# Patient Record
Sex: FEMALE | Race: WHITE | NOT HISPANIC OR LATINO | Employment: PART TIME | URBAN - METROPOLITAN AREA
[De-identification: names, ages, dates, MRNs, and addresses within clinical notes are randomized per-mention and may not be internally consistent; named-entity substitution may affect disease eponyms.]

---

## 2020-06-01 ENCOUNTER — COMMUNICATION - HEALTHEAST (OUTPATIENT)
Dept: UROLOGY | Facility: CLINIC | Age: 20
End: 2020-06-01

## 2020-06-02 ENCOUNTER — HOSPITAL ENCOUNTER (OUTPATIENT)
Dept: ADMINISTRATIVE | Facility: OTHER | Age: 20
Discharge: HOME | End: 2020-06-02

## 2020-06-05 ENCOUNTER — OFFICE VISIT - HEALTHEAST (OUTPATIENT)
Dept: UROLOGY | Facility: CLINIC | Age: 20
End: 2020-06-05

## 2020-06-05 DIAGNOSIS — N20.1 CALCULUS OF URETER: ICD-10-CM

## 2020-06-05 DIAGNOSIS — N20.0 CALCULUS OF KIDNEY: ICD-10-CM

## 2020-06-05 RX ORDER — NORETHINDRONE AND ETHINYL ESTRADIOL 1 MG-35MCG
KIT ORAL
Status: SHIPPED | COMMUNITY
Start: 2020-03-15 | End: 2022-08-04

## 2020-06-22 ENCOUNTER — AMBULATORY - HEALTHEAST (OUTPATIENT)
Dept: UROLOGY | Facility: CLINIC | Age: 20
End: 2020-06-22

## 2020-06-22 ENCOUNTER — AMBULATORY - HEALTHEAST (OUTPATIENT)
Dept: LAB | Facility: HOSPITAL | Age: 20
End: 2020-06-22

## 2020-06-22 DIAGNOSIS — N20.0 CALCULUS OF KIDNEY: ICD-10-CM

## 2020-06-25 ENCOUNTER — RECORDS - HEALTHEAST (OUTPATIENT)
Dept: ADMINISTRATIVE | Facility: OTHER | Age: 20
End: 2020-06-25

## 2020-06-25 LAB
APPEARANCE STONE: NORMAL
COMPN STONE: NORMAL
NUMBER STONE: 4
SIZE STONE: NORMAL MM
SPECIMEN WT: 11 MG

## 2020-07-01 ENCOUNTER — COMMUNICATION - HEALTHEAST (OUTPATIENT)
Dept: UROLOGY | Facility: CLINIC | Age: 20
End: 2020-07-01

## 2020-07-29 ENCOUNTER — AMBULATORY - HEALTHEAST (OUTPATIENT)
Dept: LAB | Facility: CLINIC | Age: 20
End: 2020-07-29

## 2020-07-29 DIAGNOSIS — N20.0 CALCULUS OF KIDNEY: ICD-10-CM

## 2020-07-29 LAB
ALBUMIN SERPL-MCNC: 4 G/DL (ref 3.5–5)
ANION GAP SERPL CALCULATED.3IONS-SCNC: 8 MMOL/L (ref 5–18)
BUN SERPL-MCNC: 12 MG/DL (ref 8–22)
CALCIUM SERPL-MCNC: 9.5 MG/DL (ref 8.5–10.5)
CALCIUM SERPL-MCNC: 9.7 MG/DL (ref 8.5–10.5)
CALCIUM, IONIZED MEASURED: 1.19 MMOL/L (ref 1.11–1.3)
CHLORIDE BLD-SCNC: 104 MMOL/L (ref 98–107)
CO2 SERPL-SCNC: 29 MMOL/L (ref 22–31)
CREAT SERPL-MCNC: 0.79 MG/DL (ref 0.6–1.1)
CREAT SERPL-MCNC: 0.79 MG/DL (ref 0.6–1.1)
GFR SERPL CREATININE-BSD FRML MDRD: >60 ML/MIN/1.73M2
GFR SERPL CREATININE-BSD FRML MDRD: >60 ML/MIN/1.73M2
GLUCOSE BLD-MCNC: 80 MG/DL (ref 70–125)
ION CA PH 7.4: 1.18 MMOL/L (ref 1.11–1.3)
MAGNESIUM SERPL-MCNC: 2 MG/DL (ref 1.8–2.6)
PH: 7.38 (ref 7.35–7.45)
PHOSPHATE SERPL-MCNC: 4.2 MG/DL (ref 2.5–4.5)
PHOSPHATE SERPL-MCNC: 4.4 MG/DL (ref 2.5–4.5)
POTASSIUM BLD-SCNC: 4 MMOL/L (ref 3.5–5)
PTH-INTACT SERPL-MCNC: 46 PG/ML (ref 10–86)
SODIUM SERPL-SCNC: 141 MMOL/L (ref 136–145)
URATE SERPL-MCNC: 3.5 MG/DL (ref 2–7.5)

## 2020-07-31 ENCOUNTER — OFFICE VISIT - HEALTHEAST (OUTPATIENT)
Dept: UROLOGY | Facility: CLINIC | Age: 20
End: 2020-07-31

## 2020-07-31 DIAGNOSIS — N20.0 CALCULUS OF KIDNEY: ICD-10-CM

## 2020-07-31 DIAGNOSIS — R34 LOW URINE OUTPUT: ICD-10-CM

## 2020-07-31 DIAGNOSIS — R82.992 HYPEROXALURIA: ICD-10-CM

## 2020-11-20 ENCOUNTER — AMBULATORY - HEALTHEAST (OUTPATIENT)
Dept: UROLOGY | Facility: CLINIC | Age: 20
End: 2020-11-20

## 2020-11-20 DIAGNOSIS — N20.0 CALCULUS OF KIDNEY: ICD-10-CM

## 2020-11-24 ENCOUNTER — HOSPITAL ENCOUNTER (OUTPATIENT)
Dept: CT IMAGING | Facility: HOSPITAL | Age: 20
Discharge: HOME | End: 2020-11-24
Attending: PHYSICIAN ASSISTANT

## 2020-11-24 DIAGNOSIS — N20.0 CALCULUS OF KIDNEY: ICD-10-CM

## 2020-11-25 ENCOUNTER — OFFICE VISIT - HEALTHEAST (OUTPATIENT)
Dept: UROLOGY | Facility: CLINIC | Age: 20
End: 2020-11-25

## 2020-11-25 DIAGNOSIS — R82.992 HYPEROXALURIA: ICD-10-CM

## 2020-11-25 DIAGNOSIS — N20.0 CALCULUS OF KIDNEY: ICD-10-CM

## 2020-11-25 DIAGNOSIS — R34 LOW URINE OUTPUT: ICD-10-CM

## 2021-05-26 ENCOUNTER — RECORDS - HEALTHEAST (OUTPATIENT)
Dept: ADMINISTRATIVE | Facility: CLINIC | Age: 21
End: 2021-05-26

## 2021-05-30 ENCOUNTER — RECORDS - HEALTHEAST (OUTPATIENT)
Dept: ADMINISTRATIVE | Facility: CLINIC | Age: 21
End: 2021-05-30

## 2021-06-08 NOTE — PROGRESS NOTES
2........................................  Assessment/Plan:        Diagnoses and all orders for this visit:    Calculus of kidney  -     Magnesium, 24 Hour Urine; Future; Expected date: 07/05/2020  -     Stone Formation, 24 Hour Urine (does not include Magnesium); Standing  -     Renal Function Profile; Future; Expected date: 06/19/2020  -     Uric Acid; Future; Expected date: 06/19/2020  -     Magnesium; Future; Expected date: 06/19/2020  -     Calcium, Ionized, Measured; Future; Expected date: 06/19/2020  -     Parathyroid Hormone Intact with Minerals; Future; Expected date: 06/19/2020  -     Patient Stated Goal: Prevent further stones  -     24 Hour Urine Collection Steps Education  -     Stone Analysis- Standing; Standing    Calculus of ureter    Other orders  -     PIRMELLA 1-35 mg-mcg per tablet      Stone Management Plan  [facility] 6/5/2020   Urinary Tract Infection No suspicion of infection   Renal Colic Asymptomatic at this time   Renal Failure No suspicion of renal failure   Current CT date 5/31/2020   Right sided stones? Yes   R Number of ureteral stones 1   R GSD of ureteral stones 2   R Location of ureteral stone Distal   R Number of kidney stones  5   R GSD of kidney stones 2 - 4   R Hydronephrosis Mild   R Stone Event New event   Diagnosis date 5/31/2020   Initial location of primary symptomatic stone Distal   Initial GSD of primary symptomatic stone 2   R MET status Passed   R Current Plan Observe   Observe rationale Limited stone burden with good prognosis for spontaneous passage   Left sided stones? Yes   L Number of ureteral stones No ureteral stones   L Number of kidney stones  4   L GSD of kidney stones 2 - 4   L Hydronephrosis None   L Stone Event No current event   L Current Plan Observe   Observe rationale Limited stone burden with good prognosis for spontaneous passage             Phone call duration: 18 minutes    Malachi Flanagan MD    Subjective:        The patient has  "been notified of following:     \"This telephone visit will be conducted via a call between you and your physician/provider. We have found that certain health care needs can be provided without the need for a physical exam.  This service lets us provide the care you need with a short phone conversation. If a prescription is necessary, we can send it directly to your pharmacy.  If labs and/or imaging are needed, we can place orders so that you can have the test (s) done at a later time.    If during the course of the call the physician/provider feels a telephone visit is not appropriate, you will not be charged for this service.\"     HPI  Ms. Florencia Arteaga is a 19 y.o.  female who is being evaluated via a billable telephone visit by [facility] [place] with history of onset of abdominal and right back pain the last several days of May culminating in severe pain on May 31, 2020 associated with urinary frequency and some urgency.  She had no nausea or vomiting.  She presented to the emergency room where her pain was controlled.    Lab in the ER included UA specific gravity 1.025 pH 7.0 greater than 100 RBCs 25-50 WBCs no bacteria negative nitrate.  Sodium 138 potassium 3.8 calcium 9.4 creatinine 0.81.  White blood count 12,100 hemoglobin 12.5.  Subsequent urine culture showed no growth.      CT scan with IV contrast obtained 5/31/2020 is personally reviewed and demonstrates a 1 to 2 mm stone at the right UVJ with mild hydroureteronephrosis.  There are 5 stones noted on the right kidney one in the upper pole 4 in the lower pole measuring 1 to 3 mm.  On the left there for stones measuring 2 to 4 mm in the upper and lower pole.  There is no hydronephrosis on the right.    Since being in the ER patient had mild pain initially with frequency and urgency but over the last 2 days she has had no spinal pain and no voiding symptoms.  She is recovered 3 small objects she thinks her stones.    Patient " has had no previous stones but family history is positive in her father having stones.    Inpression right distal ureteral stone passed      Bilateral small renal stones multiple as noted .      PLAN   recommended stone risk studies in view of her youth and multiple stones to include 24-hour urines x2 PTH ionized calcium renal function profile uric acid with disposition to follow.  Patient was in agreement with plan.      Patient has available to her stone trial of passage protocol to include acetaminophen ibuprofen Dramamine along with oxycodone.  Patient also has a strainer.  Advised her to make a kit and take it with her when she travels in the event that she has typical renal colic and if she can control her symptoms she need not go into an ER.        ROS   A 12 point comprehensive review of systems is negative except for HPI    No past medical history on file.    No past surgical history on file.    Current Outpatient Medications   Medication Sig Dispense Refill     PIRMELLA 1-35 mg-mcg per tablet        acetaminophen (TYLENOL) 500 MG tablet Take 2 tablets (1,000 mg total) by mouth 4 (four) times a day for 7 days. 56 tablet 0     dimenhyDRINATE (DRAMAMINE) 50 MG tablet Take 1 tablet (50 mg total) by mouth at bedtime for 7 days. 7 tablet 0     dimenhyDRINATE (DRAMAMINE) 50 MG tablet Take 1 tablet (50 mg total) by mouth 4 (four) times a day as needed. 28 tablet 0     ibuprofen (ADVIL,MOTRIN) 200 MG tablet Take 2 tablets (400 mg total) by mouth 4 (four) times a day for 7 days. 56 tablet 0     No current facility-administered medications for this visit.        Allergies   Allergen Reactions     Sulfa (Sulfonamide Antibiotics) Rash       Social History     Socioeconomic History     Marital status: Single     Spouse name: Not on file     Number of children: Not on file     Years of education: Not on file     Highest education level: Not on file   Occupational History     Not on file   Social Needs     Financial  resource strain: Not on file     Food insecurity     Worry: Not on file     Inability: Not on file     Transportation needs     Medical: Not on file     Non-medical: Not on file   Tobacco Use     Smoking status: Not on file   Substance and Sexual Activity     Alcohol use: Not on file     Drug use: Not on file     Sexual activity: Not on file   Lifestyle     Physical activity     Days per week: Not on file     Minutes per session: Not on file     Stress: Not on file   Relationships     Social connections     Talks on phone: Not on file     Gets together: Not on file     Attends Anabaptism service: Not on file     Active member of club or organization: Not on file     Attends meetings of clubs or organizations: Not on file     Relationship status: Not on file     Intimate partner violence     Fear of current or ex partner: Not on file     Emotionally abused: Not on file     Physically abused: Not on file     Forced sexual activity: Not on file   Other Topics Concern     Not on file   Social History Narrative     Not on file       No family history on file.    Objective:      Physical Exam  There were no vitals filed for this visit.    Labs

## 2021-06-08 NOTE — TELEPHONE ENCOUNTER
Message left for patient to call clinic to set up an appointment for kidney stone follow-up with our PA.  Izabela Barber RN

## 2021-06-08 NOTE — PATIENT INSTRUCTIONS - HE
Patient Stated Goal: Prevent further stones  Steps for collecting a 24 hour urine specimen    Please follow the directions carefully. All urine voided for a 24-hour period needs to be collected into the jug.  DO NOT change any of your  normal  daily habits when doing this test. Continue to follow your regular diet, intake of fluids, and usual activity level. Pick the most convenient day with your schedule, perhaps on a weekend or a day off.    Start your Diet Log the day before collection and continue on the day of urine collection.  You MUST bring Diet Log with you on follow up visit to discuss results.    One 24hr Urine Collection     Two 24hr Urine Collections  (do not collect on consecutive days)    PLEASE COMPLETE THE 2nd JUG WITHIN 1-2 WEEKS FROM THE 1st JUG    STEP 1  Empty your bladder completely into the toilet. This will be your start time. Write your full legal name, start date and time on the jug label.  Collection start and stop times need to match exactly!  For example:  6 am to 6 am.    STEP 2  The next time you urinate, empty your bladder directly into the jug or collection hat and pour urine into the jug.  Screw the lid back onto the jug.  Do not spill!    STEP 3  Place the jug in the refrigerator or a cooler with ice during the collection period.  Failure to keep it cool could cause inaccurate test results. DO NOT Freeze.    STEP 4  Continue collecting all urine into the jug for the rest of the day, for the full 24 hours.  DO NOT stop early or go over 24 hours!    STEP 5  Exactly 24 hours from start of collection, write your full legal name, stop date and time on the jug label.   Collection start and stop times need to match exactly!  For example:  6 am to 6 am.  Failure to label correctly will result in recollection of urine specimen.    STEP 6  Return each jug within 24 hours after final urination.     STEP 7  Drop off jug locations:   [facility]: Mon-Fri 7am-7pm - Closed on  weekends  [facility]: Mon-Fri 7am-5pm - Closed on Sunday  [facility]: Mon-Fri 7am-6:30pm - Closed on weekends    STEP 8  Please call [facility] after return of your final jug to schedule your follow-up visit. [phone]

## 2021-06-10 NOTE — PATIENT INSTRUCTIONS - HE
Patient Stated Goal: Prevent further stones  INCREASING FLUIDS TO DECREASE RISK    Low Urinary Volume:     Kidney stones form because there is not enough water to dissolve the concentrated chemicals and minerals in urine.     Studies have found that people who make kidney stones should drink enough fluids so that they produce at least 2 liters (2 quarts) of urine per day.     Studies have shown that virtually every fluid you might drink decreases the risk of stone formation. Acceptable fluids include milk, coffee, diet and regular sodas, alcoholic beverages, fruit juices and even plain old water.    Increasing fluid intake will increase urine volume and decrease the chance of kidney stone formation.    Fluids:    Anything liquid that fits in a glass counts.     One way to gauge if your body has enough fluids is to check the color of your urine. If the urine is dark and yellow you do not have enough fluids. Urine will be pale yellow to clear if your body has enough fluids.    What we need to survive:    Most fluid we drink is lost through evaporation, more if active in hot humid environments    Body wastes can be cleared in a small amount of urine    All fluid that is consumed in excess of necessary losses  dilutes the urine    Ways to Increase Fluids Daily:    Drink more fluids throughout the day and into the evening. (You may need to awake from sleep to urinate which is a good indication of volume status)    Keep your refrigerator stocked with beverages you like    Drink a variety of fluids - mix it up    Add another beverage to your regular beverage at every meal    Keep a beverage at your desk (work area) and finish it before you leave for breaks, meetings, lunch and end of day    Use larger volume glasses    Whenever you pass a water fountain - stop and drink    Drink a beverage with snacks, if it is a salty snack, double the beverage    Take medication with a full glass of water/fluid    Keep a bottle of  water in your car and drink every 20 miles    Eat high water content fruits (melons, oranges, grapes, etc.)    Flavor water with a squeeze of lemon or lime or Crystal Light     If you do something repetitive throughout the day, link it with having something to drink    When you give your child/children something to drink, you have something to drink also    [facility] [place] can respond to your questions or concerns 24 hours a day at [phone].      Oxalate Food List    See Handout:  Do not eat foods containing more than 50 mg oxalate per 100 gm serving.  Foods containing between 5-50mg should be eaten in moderation (a single 4oz. serving per day).  Remember the purpose of the low oxalate diet is to avoid supersaturation (excess concentration) of the urine with oxalate; therefore small amounts periodically are less harmful than single large amounts.

## 2021-06-10 NOTE — PROGRESS NOTES
Assessment/Plan:        Diagnoses and all orders for this visit:    Calculus of kidney  -     Magnesium, 24 Hour Urine; Future; Expected date: 10/29/2020  -     Stone Formation, 24 Hour Urine (does not include Magnesium); Future; Expected date: 10/29/2020  -     Patient Stated Goal: Prevent further stones  -     Calcium Oxalate Stone Prevention Education  -     Patient Increasing Fluids Education    Hyperoxaluria  -     Magnesium, 24 Hour Urine; Future; Expected date: 10/29/2020  -     Stone Formation, 24 Hour Urine (does not include Magnesium); Future; Expected date: 10/29/2020  -     Patient Stated Goal: Prevent further stones  -     Calcium Oxalate Stone Prevention Education  -     Patient Increasing Fluids Education    Low urine output  -     Magnesium, 24 Hour Urine; Future; Expected date: 10/29/2020  -     Stone Formation, 24 Hour Urine (does not include Magnesium); Future; Expected date: 10/29/2020  -     Patient Stated Goal: Prevent further stones  -     Calcium Oxalate Stone Prevention Education  -     Patient Increasing Fluids Education      Stone Management Plan  [facility] 6/5/2020 7/31/2020   Urinary Tract Infection No suspicion of infection No suspicion of infection   Renal Colic Asymptomatic at this time Asymptomatic at this time   Renal Failure No suspicion of renal failure No suspicion of renal failure   Current CT date 5/31/2020 -   Right sided stones? Yes -   R Number of ureteral stones 1 -   R GSD of ureteral stones 2 -   R Location of ureteral stone Distal -   R Number of kidney stones  5 -   R GSD of kidney stones 2 - 4 -   R Hydronephrosis Mild -   R Stone Event New event No current event   Diagnosis date 5/31/2020 -   Initial location of primary symptomatic stone Distal -   Initial GSD of primary symptomatic stone 2 -   R MET status Passed -   R Current Plan Observe -   Observe rationale Limited stone burden with good prognosis for spontaneous passage -   Left sided stones? Yes -  "  L Number of ureteral stones No ureteral stones -   L Number of kidney stones  4 -   L GSD of kidney stones 2 - 4 -   L Hydronephrosis None -   L Stone Event No current event No current event   L Current Plan Observe -   Observe rationale Limited stone burden with good prognosis for spontaneous passage -             Phone call duration: 13 minutes    Malachi Flanagan MD     Subjective:      The patient has been notified of following:     \"This telephone visit will be conducted via a call between you and your physician/provider. We have found that certain health care needs can be provided without the need for a physical exam.  This service lets us provide the care you need with a short phone conversation.  If a prescription is necessary we can send it directly to your pharmacy.  If labs and/or imaging are needed, we can place orders so you can have the test (s) done at a later time.    If during the course of the call the physician/provider feels a telephone visit is not appropriate, you will not be charged for this service.\"     HPI  Ms. Florencia Arteaga is a 19 y.o.  female who is being evaluated via a billable telephone visit by [facility] [place] for follow-up stone risk studies with history of passing small distal right stone comprised of 30% calcium oxalate 70% calcium phosphate appetite.  Family history positive to relatives.  This was patient's first stone.    Serum studies included normal PTH of 46 normal ionized calcium normal uric acid.  24-hour urine studies demonstrated 980 cc and 1140 cc respectively for volume oxalate elevated at 49 and 54.  Citrate low normal 391 440.  Remainder of studies were all normal.    Impression bilateral renal stones 5 on right 1 to 3 mm 4 on left largest being 5.4 the rest 2 to 4 mm  Risk factors low volume hyperoxaluria    Plan increase urine output 80 to 100 ounces per 24 hours avoid excess high oxalate containing foods in large amounts  Use of " citrate rich fluids such as Crystal light lemonade or using real lemon juice or fresh arcadio and water to help with adding fluid intake    Follow-up 24-hour urine 3 months.    Would recommend CT in roughly 1 year.    explained the patient she might consider clearance of stones at some point if she were to decide to have a family and stones were growing and not easily passable.           ROS   Review of systems is negative except for HPI.    No past medical history on file.    No past surgical history on file.    Current Outpatient Medications   Medication Sig Dispense Refill     PIRMELLA 1-35 mg-mcg per tablet        No current facility-administered medications for this visit.        Allergies   Allergen Reactions     Sulfa (Sulfonamide Antibiotics) Rash       Social History     Socioeconomic History     Marital status: Single     Spouse name: Not on file     Number of children: Not on file     Years of education: Not on file     Highest education level: Not on file   Occupational History     Not on file   Social Needs     Financial resource strain: Not on file     Food insecurity     Worry: Not on file     Inability: Not on file     Transportation needs     Medical: Not on file     Non-medical: Not on file   Tobacco Use     Smoking status: Not on file   Substance and Sexual Activity     Alcohol use: Not on file     Drug use: Not on file     Sexual activity: Not on file   Lifestyle     Physical activity     Days per week: Not on file     Minutes per session: Not on file     Stress: Not on file   Relationships     Social connections     Talks on phone: Not on file     Gets together: Not on file     Attends [employer]ist service: Not on file     Active member of club or organization: Not on file     Attends meetings of clubs or organizations: Not on file     Relationship status: Not on file     Intimate partner violence     Fear of current or ex partner: Not on file     Emotionally abused: Not on file     Physically  abused: Not on file     Forced sexual activity: Not on file   Other Topics Concern     Not on file   Social History Narrative     Not on file       No family history on file.    Objective:      Labs   Stone prevention labs   Serum chemistries   Creatinine   Date Value Ref Range Status   07/29/2020 0.79 0.60 - 1.10 mg/dL Final   07/29/2020 0.79 0.60 - 1.10 mg/dL Final   05/31/2020 0.81 0.60 - 1.10 mg/dL Final     Potassium   Date Value Ref Range Status   07/29/2020 4.0 3.5 - 5.0 mmol/L Final   05/31/2020 3.8 3.5 - 5.0 mmol/L Final     Calcium   Date Value Ref Range Status   07/29/2020 9.7 8.5 - 10.5 mg/dL Final   07/29/2020 9.5 8.5 - 10.5 mg/dL Final   05/31/2020 9.4 8.5 - 10.5 mg/dL Final     Phosphorus   Date Value Ref Range Status   07/29/2020 4.4 2.5 - 4.5 mg/dL Final   07/29/2020 4.2 2.5 - 4.5 mg/dL Final     Uric Acid   Date Value Ref Range Status   07/29/2020 3.5 2.0 - 7.5 mg/dL Final   , Calcium metabolism   Calcium, Ionized Measured   Date Value Ref Range Status   07/29/2020 1.19 1.11 - 1.30 mmol/L Final      PTH   Date Value Ref Range Status   07/29/2020 46 10 - 86 pg/mL Final     No results found for: [id number]  and 24 hour urine No results found for: ECFXJ11TKCG, CALCIUMUR, SQ67IFM, [id number], [id number], LABPH, LABURIN

## 2021-06-13 NOTE — PROGRESS NOTES
.  Assessment/Plan:        Diagnoses and all orders for this visit:    Calculus of kidney  -     CT Abdomen Pelvis Without Oral Without IV Contrast; Future; Expected date: 11/25/2021  -     Patient Stated Goal: Prevent further stones    Hyperoxaluria  -     CT Abdomen Pelvis Without Oral Without IV Contrast; Future; Expected date: 11/25/2021  -     Patient Stated Goal: Prevent further stones             24-hour urine Litholink study 1 year  Low urine output  -     CT Abdomen Pelvis Without Oral Without IV Contrast; Future; Expected date: 11/25/2021  -     Patient Stated Goal: Prevent further stones      Stone Management Plan  [facility] 6/5/2020 7/31/2020 11/25/2020   Urinary Tract Infection No suspicion of infection No suspicion of infection No suspicion of infection   Renal Colic Asymptomatic at this time Asymptomatic at this time Asymptomatic at this time   Renal Failure No suspicion of renal failure No suspicion of renal failure No suspicion of renal failure   Current CT date 5/31/2020 - 11/24/2020   Right sided stones? Yes - Yes   R Number of ureteral stones 1 - 1   R GSD of ureteral stones 2 - -   R Location of ureteral stone Distal - -   R Number of kidney stones  5 - 4   R GSD of kidney stones 2 - 4 - < 2   R Hydronephrosis Mild - Mild   R Stone Event New event No current event New event   Diagnosis date 5/31/2020 - -   Initial location of primary symptomatic stone Distal - -   Initial GSD of primary symptomatic stone 2 - -   R MET status Passed - -   R Current Plan Observe - Observe   Observe rationale Limited stone burden with good prognosis for spontaneous passage - Limited stone burden with good prognosis for spontaneous passage   Left sided stones? Yes - Yes   L Number of ureteral stones No ureteral stones - No ureteral stones   L Number of kidney stones  4 - 1   L GSD of kidney stones 2 - 4 - 2 - 4   L Hydronephrosis None - -   L Stone Event No current event No current event No current event  "  L Current Plan Observe - Observe   Observe rationale Limited stone burden with good prognosis for spontaneous passage - Limited stone burden with good prognosis for spontaneous passage             Phone call duration: 17 minutes    Malachi Flanagan MD     Subjective:      The patient has been notified of following:     \"This telephone visit will be conducted via a call between you and your physician/provider. We have found that certain health care needs can be provided without the need for a physical exam.  This service lets us provide the care you need with a short phone conversation.  If a prescription is necessary we can send it directly to your pharmacy.  If labs and/or imaging are needed, we can place orders so you can have the test (s) done at a later time.    If during the course of the call the physician/provider feels a telephone visit is not appropriate, you will not be charged for this service.\"     HPI  Ms. Florencia Arteaga is a 20 y.o.  female who is being evaluated via a billable telephone visit by [facility] [place] with history of passing right ureteral stone a year ago with CT in May 31, 2020 showing 5 stones in the right kidney 1 to 3 mm 4 in the left the largest being 5.4 mm.  Patient subsequently had stone risk studies showing elevated oxalate of 49 and 54 with low volume of 990 cc and 1114 cc.  She was placed on a citrate rich diet low oxalate diet and told to increase fluids to 80 ounces per 24 hours.    In the interval since seen she has had several episodes of right flank pain with some voiding symptoms of urgency and frequency but did not strain her urine and has not seen a stone pass.  Patient ultimately had repeat CT scan 11/24/2020.    Personal review of CT scan 11/24/2020 shows for 1 to 2 mm stones in the right kidney with a single 3 x 2.4 mm stone in the left kidney lower pole.  Should be noted CT back in 5/31/2020 showed 3 2 to 3 mm stones in the right kidney.  " Difficult to be accurate on size because she had contrast but no question there were some larger stones.    Impression spontaneous passage right renal stones 2 or 3 in number not recovered known residual 1 mm stones numbering 4 on right with a single 3 x 2 mm stone left lower pole  Risk factors low volume and hyperoxaluria being addressed    Plan recommended patient do 24-hour urine volume on her own to be sure she is having adequate output of 80 ounces plus a day.  We will plan to have her follow-up 1 year with CT and 24-hour urine.    On previous visit we entertained the notion of possibly flaring stones should she desire to get pregnant prior to pregnancy.  With Covid at this time being present recommended that we not entertain that notion as stones are passable at least in the near future.  At some point we might consider clearing the left lower pole stone should she decide to get pregnant in the Covid issue has lessened.  Patient will strain her urine in the future should she have flank pain.           ROS   Review of systems is negative except for HPI.    No past medical history on file.    No past surgical history on file.    Current Outpatient Medications   Medication Sig Dispense Refill     PIRMELLA 1-35 mg-mcg per tablet        No current facility-administered medications for this visit.        Allergies   Allergen Reactions     Sulfa (Sulfonamide Antibiotics) Rash       Social History     Socioeconomic History     Marital status: Single     Spouse name: Not on file     Number of children: Not on file     Years of education: Not on file     Highest education level: Not on file   Occupational History     Not on file   Social Needs     Financial resource strain: Not on file     Food insecurity     Worry: Not on file     Inability: Not on file     Transportation needs     Medical: Not on file     Non-medical: Not on file   Tobacco Use     Smoking status: Not on file   Substance and Sexual Activity     Alcohol  use: Not on file     Drug use: Not on file     Sexual activity: Not on file   Lifestyle     Physical activity     Days per week: Not on file     Minutes per session: Not on file     Stress: Not on file   Relationships     Social connections     Talks on phone: Not on file     Gets together: Not on file     Attends [employer] service: Not on file     Active member of club or organization: Not on file     Attends meetings of clubs or organizations: Not on file     Relationship status: Not on file     Intimate partner violence     Fear of current or ex partner: Not on file     Emotionally abused: Not on file     Physically abused: Not on file     Forced sexual activity: Not on file   Other Topics Concern     Not on file   Social History Narrative     Not on file       No family history on file.    Objective:      Labs

## 2022-06-09 ENCOUNTER — TELEPHONE (OUTPATIENT)
Dept: UROLOGY | Facility: CLINIC | Age: 22
End: 2022-06-09

## 2022-06-09 NOTE — TELEPHONE ENCOUNTER
[facility] Call Center    Phone Message    May a detailed message be left on voicemail: yes     Reason for Call: Patient of Malachi Flanagan MD.  Last seen with clinic on 11/25/2020.  Patient states she is moving to hospitals in couple of months and the nearest hospital is over an hour away.  She was looking to speak with provider in regards to her care, stones and letter referencing her stones.  Please reach out to patient.    Writer informed patient that said provider is no longer with clinic.    Action Taken: Message routed to:  [facility] ([facility] KATELYN    Travel Screening: Not Applicable

## 2022-07-29 DIAGNOSIS — N20.0 CALCULUS OF KIDNEY: Primary | ICD-10-CM

## 2022-08-04 ENCOUNTER — VIRTUAL VISIT (OUTPATIENT)
Dept: UROLOGY | Facility: CLINIC | Age: 22
End: 2022-08-04
Payer: COMMERCIAL

## 2022-08-04 ENCOUNTER — HOSPITAL ENCOUNTER (OUTPATIENT)
Dept: CT IMAGING | Facility: CLINIC | Age: 22
Discharge: HOME | End: 2022-08-04
Attending: FAMILY MEDICINE | Admitting: FAMILY MEDICINE
Payer: COMMERCIAL

## 2022-08-04 DIAGNOSIS — N20.0 CALCULUS OF KIDNEY: Primary | ICD-10-CM

## 2022-08-04 DIAGNOSIS — N20.0 CALCULUS OF KIDNEY: ICD-10-CM

## 2022-08-04 PROCEDURE — 74176 CT ABD & PELVIS W/O CONTRAST: CPT

## 2022-08-04 PROCEDURE — 99213 OFFICE O/P EST LOW 20 MIN: CPT | Mod: GT | Performed by: UROLOGY

## 2022-08-04 RX ORDER — ETONOGESTREL AND ETHINYL ESTRADIOL VAGINAL RING .015; .12 MG/D; MG/D
1 RING VAGINAL
COMMUNITY
Start: 2021-08-16 | End: 2023-08-03

## 2022-08-04 RX ORDER — ACETAMINOPHEN 500 MG
1000 TABLET ORAL
Status: CANCELLED | OUTPATIENT
Start: 2022-08-04

## 2022-08-04 RX ORDER — OXYCODONE HYDROCHLORIDE 5 MG/1
5-10 TABLET ORAL EVERY 4 HOURS PRN
Qty: 20 TABLET | Refills: 0 | Status: SHIPPED | OUTPATIENT
Start: 2022-08-04

## 2022-08-04 RX ORDER — GABAPENTIN 100 MG/1
300 CAPSULE ORAL
Status: CANCELLED | OUTPATIENT
Start: 2022-08-04

## 2022-08-04 RX ORDER — TAMSULOSIN HYDROCHLORIDE 0.4 MG/1
0.4 CAPSULE ORAL DAILY
Qty: 14 CAPSULE | Refills: 1 | Status: SHIPPED | OUTPATIENT
Start: 2022-08-04 | End: 2022-08-18

## 2022-08-04 RX ORDER — TOLTERODINE TARTRATE 2 MG/1
2 TABLET, EXTENDED RELEASE ORAL EVERY 12 HOURS
Qty: 28 TABLET | Refills: 1 | Status: SHIPPED | OUTPATIENT
Start: 2022-08-04

## 2022-08-04 RX ORDER — ONDANSETRON 4 MG/1
4 TABLET, ORALLY DISINTEGRATING ORAL EVERY 6 HOURS PRN
Qty: 10 TABLET | Refills: 1 | Status: SHIPPED | OUTPATIENT
Start: 2022-08-04

## 2022-08-04 RX ORDER — KETOROLAC TROMETHAMINE 30 MG/ML
15 INJECTION, SOLUTION INTRAMUSCULAR; INTRAVENOUS
Status: CANCELLED | OUTPATIENT
Start: 2022-08-04

## 2022-08-04 ASSESSMENT — PAIN SCALES - GENERAL: PAINLEVEL: NO PAIN (0)

## 2022-08-04 NOTE — PATIENT INSTRUCTIONS
Patient Stated Goal: Know what to expect after surgery  Ureteroscopy    Ureteroscopy is a procedure which is done for clearance of stones from the ureter, kidney or both. There are no incisions involved. The procedure involves your surgeon placing a small scope into your urethra. This is the opening where urine leaves your body.  The surgeon watches as they carefully guide the scope to the stone(s).  Modern flexible ureteroscopes can be used to reach virtually any location within the urinary tract.     The size, shape and location of the stone determines how best to treat the stone(s).  Whenever possible, stones are removed in one piece.  Larger stones need to be broken using a laser before removing in smaller pieces.  The goal is to remove all stones and stone fragments from that side of the body in a single treatment.  Complete stone clearance is an important step to prevent future kidney stone episodes.    Surgery:    Same day outpatient procedure    30-60 minutes    Procedure done in hospital surgical suite    General anesthesia (you will be asleep during the procedure)     Antibiotic prior to surgery to prevent infection    Physician will visit with you and respond to any questions or concerns and consent will be signed prior to going to the operating room    Risks:    Infection - Preoperative antibiotics should prevent new infections but it is possible that unanticipated bacteria may be introduced at time of surgery or that the stones were actually chronically infected before surgery      Injury - The ureter may be injured during this procedure.  This is most likely to happen if the ureter was very inflamed before surgery or if a stone is very tightly impacted.  The surgeon will not aggressively treat a stone if this creates a risk of injury.        Inaccessible Stones -A single procedure is effective in 95% of cases, but if your ureter is very narrow or your kidney stone is very impacted, a stent will be  placed and the procedure stopped.  In 1-2 weeks after the ureter has relaxed, the patient will be brought back to surgery and the procedure can be safely performed.      Incomplete stone clearance -Occasionally stone or stone fragments may not be completely cleared.  These may pass on their own, which may cause discomfort.  Our goal is to remove all possible stones and fragments.    Stent:      An internal soft tube will be placed between the kidney and the bladder while in surgery (after the stone is cleared). The stent will keep the kidney draining.    What should I expect?     It is common for a stent to cause some irritation and discomfort.   You may have:      The need to urinate suddenly     The need to urinate often     Pain during urination     A dull backache, which may get worse during urination     Blood stained urine (like fruit punch) and occasional small clots    It s important to remember the stent is necessary and only temporary. To feel more comfortable:      Drink more than you normally would but you do not have to constantly  flush your kidneys     Limiting your activity may decrease irritation or bleeding    Ibuprofen - 2 tablets every 6-8 hours     Use pain medications as directed.    When is the stent removed?    Most stents are removed within 5 days to 2 weeks after a procedure.     How is the stent removed?     Your stent will be removed in the [facility] with a small telescope and a grasping tool.  It usually takes less than 1 minute to remove the stent.    What should I expect after the stent is removed?     You should feel normal by the next day    Some patients find:    An increase in back pain about an hour after the stent is removed as the kidney fills up with urine before it starts to empty.  It can be as uncomfortable as your initial stone episode.  Taking pain medications before stent removal may be helpful, but you would need someone else to drive you to and from your  appointment.    Bladder symptoms usually disappear by the next morning.    Small amounts of blood in the urine may be seen occasionally for up to a week.    Diet:      After surgery, there are no dietary restrictions - Drink to thirst, there is no need to increase intake of fluids, as this may increase nausea symptoms. Try to eat smaller, more frequent snacks, instead of large meals.    Activity:    Many people return to work within 1-2 days. Fatigue is normal for a couple of weeks following surgery. With increased activity you may experience more discomfort and you may notice more blood in your urine.      Post-Operative Symptom Control    While you recover from your procedure, you can take steps to ease your recovery.    Medications that prevent further episodes of severe pain and help stones pass: Take these as prescribed on a regular basis even if you are NOT in pain      Ibuprofen (Advil or Motrin) - Is available over the counter Take 2 (200mg) tablets every 6 hours until the stone passes.  o prevents spasm of the ureter.    o Decreases pain      Dramamine - (drowsy version, non-generic formulation) Is available over the counter and decreases spasm of the ureter.  Take 50mg at bedtime every night until the stone passes. In addition, take every 6 hours as needed.  Dramamine:  o Decreases nausea  o Decreases acute pain  o Decreases recurrence of pain for next 24 hours  o Will help you sleep        *This medication will cause increased drowsiness, do not drive or operate machinery for 6 hours      Flomax- Studies show that Flomax decreases irritation from stents.   o Take every day with food until stone passes even if you do not have pain  o Flomax does not relieve pain.        *This medication may cause nasal congestion or light-headedness      Detrol ( Tolterodine) - After surgery Detrol may decrease stent irritation and pelvic pain  o Take as prescribed     *This medication may cause dry mouth, constipation or  blurry vision. Stop medication if unable to urinate.    Medication that are taken as needed to manage break through symptoms: Take these ONLY as required and hopefully not at all      Narcotics (Percocet, Vicodin, Dilaudid)- take as prescribed for severe pain unrelieved by ibuprofen and dramamine  o Take as prescribed for severe pain  o Narcotics have significant side effects and only  cover-up  pain. They have no effect on cause of pain.  o Common side effects:  - Confusion, disorientation and sedation - DO NOT DRIVE OR OPERATE MACHINERY WITHIN 24 HOURS  - Nausea - take Dramamine or Zofran  or Haldol to help control  - Constipation  - Sleep disturbances      Ondansetron (Zofran)-  o Take as prescribed  o Reserve for severe nausea  o May cause constipation, start over the counter Miralax if needed to treat this    Haldol-  o Take as prescribed  o Reserve for severe nausea    Warning Signs/Symptoms - Please call the [facility] 24 hours a day at [phone] IMMEDIATELY if you experience any of these:    Fever greater than 100.1     Chills    Pain NOT CONTROLLED by pain medications    Heavy bleeding or large clots in urine (small clots can be normal)    Persistent nausea and/or vomiting    Post-Operative Follow up:    The stone(s) will be sent from surgery to a lab for composition analysis.  These results are usually available before a one month post-operative visit.  If you had laser treatment to break up your stone, you will usually be scheduled for a low dose CT scan prior to your one month appointment.  This scan allows your surgeon to confirm that all stone fragments were cleared at time of surgery and that there have been no complications.  These results along with possible labs and urine studies will help us develop an individualized plan to prevent new stones from forming and keep existing stones from enlarging.  This visit is usually scheduled about 1 month after your original surgery.    The  [facility] can respond to your questions or concerns 24 hours a day at [phone].

## 2022-08-04 NOTE — H&P (VIEW-ONLY)
Assessment/Plan:    Assessment & Plan   Florencia was seen today for long term and flank pain.    Diagnoses and all orders for this visit:    Calculus of kidney  -     Patient Stated Goal: Know what to expect after surgery  -     tamsulosin (FLOMAX) 0.4 MG capsule; Take 1 capsule (0.4 mg) by mouth daily for 14 days  -     ondansetron (ZOFRAN ODT) 4 MG ODT tab; Take 1 tablet (4 mg) by mouth every 6 hours as needed for nausea  -     tolterodine (DETROL) 2 MG tablet; Take 1 tablet (2 mg) by mouth every 12 hours  -     oxyCODONE (ROXICODONE) 5 MG tablet; Take 1-2 tablets (5-10 mg) by mouth every 4 hours as needed for pain  -     Case Request: CYSTOURETEROSCOPY, WITH LASER LITHOTRIPSY, CALCULUS REMOVAL AND URETERAL STENT INSERTION; Standing  -     Case Request: CYSTOURETEROSCOPY, WITH LASER LITHOTRIPSY, CALCULUS REMOVAL AND URETERAL STENT INSERTION    Other orders  -     Forced Air Warming Device; Standing  -     Notify Provider - Anticoagulants and Antiplatelets; Standing  -     Glucose monitor nursing POCT; Standing  -     NPO per Anesthesia Guidelines for Procedure/Surgery Except for: Meds; Standing  -     Apply Pneumatic Compression Device (PCD); Standing  -     Pneumatic Compression Device (PCD) (Equipment); Standing  -     ceFAZolin (ANCEF) 2 g in sodium chloride 0.9 % 100 mL intermittent infusion  -     ceFAZolin (ANCEF) 2 g in sodium chloride 0.9 % 100 mL intermittent infusion  -     gabapentin (NEURONTIN) capsule 300 mg  -     acetaminophen (TYLENOL) tablet 1,000 mg  -     XR Surgery DARRIUS Fluoro Less Than 5 Min; Standing  -     ketorolac (TORADOL) injection 15 mg  -     XR Surgery DARRIUS Fluoro Less Than 5 Min        Stone Management Plan  Stone Management 7/31/2020 11/25/2020 8/4/2022   Urinary Tract Infection No suspicion of infection No suspicion of infection No suspicion of infection   Renal Colic Asymptomatic at this time Asymptomatic at this time Asymptomatic at this time   Renal Failure No suspicion of renal  failure No suspicion of renal failure No suspicion of renal failure   Current CT date - 11/24/2020 8/4/2022   Right sided stones? - Yes Yes   R Number of ureteral stones - 1 No ureteral stones   R GSD of ureteral stones - - -   R Location of ureteral stone - - -   R Number of kidney stones  - 4 4   R GSD of kidney stones - < 2 < 2   R Hydronephrosis - Mild None   R Stone Event No current event New event No current event   Diagnosis date - - -   Initial location of primary symptomatic stone - - -   Initial GSD of primary symptomatic stone - - -   R MET status - - -   R Current Plan - Observe -   Observe rationale - Limited stone burden with good prognosis for spontaneous passage -   Left sided stones? - Yes Yes   L Number of ureteral stones - No ureteral stones No ureteral stones   L Number of kidney stones  - 1 3   L GSD of kidney stones - 2 - 4 4 - 10   L Hydronephrosis - - None   L Stone Event No current event No current event New event   Diagnosis date - - 8/4/2022   Initial location of primary symptomatic stone - - Renal   Initial GSD of primary symptomatic stone - - 6   L Current Plan - Observe Clear   Clear rationale - - Patient preference   Observe rationale - Limited stone burden with good prognosis for spontaneous passage -             PLAN    Video call duration: 15 minutes  20 minutes spent on the date of the encounter doing chart review, history and exam, documentation and further activities per the note    ROXY CARTER MD  [facility]    HPI  Ms. Florencia Arteaga is a 21 year old  female who is being evaluated via a billable video visit by [facility] [place] for follow up of her stone disease.    She has been doing well in the interim. Believes that she passed a stone a few weeks ago while travelling in [place]. She will be going to [facility] for a year in mid-September and would like to reduce her stone burden.    Current CT is  reviewed and is fairly stable with previous from two years ago. Might be one upper pole right papillary  Tip calcification missing. Left lower pole stone has grown from 4 to 6 mm.     Reasonable to clear the left renal stones prior to her year abroad. Will set up for late August. Risks and benefits discussed.    ROS   Review of systems is negative except for HPI.    No past medical history on file.    No past surgical history on file.    Current Outpatient Medications   Medication Sig Dispense Refill     etonogestrel-ethinyl estradiol (NUVARING) 0.12-0.015 MG/24HR vaginal ring Place 1 each vaginally every 28 days       ondansetron (ZOFRAN ODT) 4 MG ODT tab Take 1 tablet (4 mg) by mouth every 6 hours as needed for nausea 10 tablet 1     oxyCODONE (ROXICODONE) 5 MG tablet Take 1-2 tablets (5-10 mg) by mouth every 4 hours as needed for pain 20 tablet 0     tamsulosin (FLOMAX) 0.4 MG capsule Take 1 capsule (0.4 mg) by mouth daily for 14 days 14 capsule 1     tolterodine (DETROL) 2 MG tablet Take 1 tablet (2 mg) by mouth every 12 hours 28 tablet 1       Allergies   Allergen Reactions     Sulfa (Sulfonamide Antibiotics) [Sulfa Drugs] Rash       Social History     Socioeconomic History     Marital status: Single     Spouse name: Not on file     Number of children: Not on file     Years of education: Not on file     Highest education level: Not on file   Occupational History     Not on file   Tobacco Use     Smoking status: Not on file     Smokeless tobacco: Not on file   Substance and Sexual Activity     Alcohol use: Not on file     Drug use: Not on file     Sexual activity: Not on file   Other Topics Concern     Not on file   Social History Narrative     Not on file     Social Determinants of Health     Financial Resource Strain: Not on file   Food Insecurity: Not on file   Transportation Needs: Not on file   Physical Activity: Not on file   Stress: Not on file   Social Connections: Not on file   Intimate Partner  Violence: Not on file   Housing Stability: Not on file       No family history on file.    Objective:     Appears AAO x 3  No vitals obtained due to virtual visit    Labs   Most Recent 3 CBC's:Recent Labs   Lab Test 05/31/20  1905   WBC 12.1*   HGB 12.5   MCV 87        Most Recent 3 BMP's:Recent Labs   Lab Test 07/29/20  1405 05/31/20  1905    138   POTASSIUM 4.0 3.8   CHLORIDE 104 105   CO2 29 23   BUN 12 10   CR 0.79  0.79 0.81   ANIONGAP 8 10   JACQUIE 9.7  9.5 9.4   GLC 80 112     Most Recent Urinalysis:Recent Labs   Lab Test 05/31/20  1923   COLOR Yellow   APPEARANCE Clear   URINEGLC Negative   URINEBILI Negative   URINEKETONE Negative   SG 1.021   UBLD Moderate*   URINEPH 7.0   PROTEIN 50 mg/dL*   UROBILINOGEN <2.0 E.U./dL   NITRITE Negative   LEUKEST Moderate*   RBCU >100*   WBCU 25-50*     Acute Labs   Urine Culture    Culture   Date Value Ref Range Status   05/31/2020 No Growth  Final   05/31/2020 No Growth  Final

## 2022-08-04 NOTE — PROGRESS NOTES
Assessment/Plan:    Assessment & Plan   Florencia was seen today for long term and flank pain.    Diagnoses and all orders for this visit:    Calculus of kidney  -     Patient Stated Goal: Know what to expect after surgery  -     tamsulosin (FLOMAX) 0.4 MG capsule; Take 1 capsule (0.4 mg) by mouth daily for 14 days  -     ondansetron (ZOFRAN ODT) 4 MG ODT tab; Take 1 tablet (4 mg) by mouth every 6 hours as needed for nausea  -     tolterodine (DETROL) 2 MG tablet; Take 1 tablet (2 mg) by mouth every 12 hours  -     oxyCODONE (ROXICODONE) 5 MG tablet; Take 1-2 tablets (5-10 mg) by mouth every 4 hours as needed for pain  -     Case Request: CYSTOURETEROSCOPY, WITH LASER LITHOTRIPSY, CALCULUS REMOVAL AND URETERAL STENT INSERTION; Standing  -     Case Request: CYSTOURETEROSCOPY, WITH LASER LITHOTRIPSY, CALCULUS REMOVAL AND URETERAL STENT INSERTION    Other orders  -     Forced Air Warming Device; Standing  -     Notify Provider - Anticoagulants and Antiplatelets; Standing  -     Glucose monitor nursing POCT; Standing  -     NPO per Anesthesia Guidelines for Procedure/Surgery Except for: Meds; Standing  -     Apply Pneumatic Compression Device (PCD); Standing  -     Pneumatic Compression Device (PCD) (Equipment); Standing  -     ceFAZolin (ANCEF) 2 g in sodium chloride 0.9 % 100 mL intermittent infusion  -     ceFAZolin (ANCEF) 2 g in sodium chloride 0.9 % 100 mL intermittent infusion  -     gabapentin (NEURONTIN) capsule 300 mg  -     acetaminophen (TYLENOL) tablet 1,000 mg  -     XR Surgery DARRISU Fluoro Less Than 5 Min; Standing  -     ketorolac (TORADOL) injection 15 mg  -     XR Surgery DARRIUS Fluoro Less Than 5 Min        Stone Management Plan  Stone Management 7/31/2020 11/25/2020 8/4/2022   Urinary Tract Infection No suspicion of infection No suspicion of infection No suspicion of infection   Renal Colic Asymptomatic at this time Asymptomatic at this time Asymptomatic at this time   Renal Failure No suspicion of renal  failure No suspicion of renal failure No suspicion of renal failure   Current CT date - 11/24/2020 8/4/2022   Right sided stones? - Yes Yes   R Number of ureteral stones - 1 No ureteral stones   R GSD of ureteral stones - - -   R Location of ureteral stone - - -   R Number of kidney stones  - 4 4   R GSD of kidney stones - < 2 < 2   R Hydronephrosis - Mild None   R Stone Event No current event New event No current event   Diagnosis date - - -   Initial location of primary symptomatic stone - - -   Initial GSD of primary symptomatic stone - - -   R MET status - - -   R Current Plan - Observe -   Observe rationale - Limited stone burden with good prognosis for spontaneous passage -   Left sided stones? - Yes Yes   L Number of ureteral stones - No ureteral stones No ureteral stones   L Number of kidney stones  - 1 3   L GSD of kidney stones - 2 - 4 4 - 10   L Hydronephrosis - - None   L Stone Event No current event No current event New event   Diagnosis date - - 8/4/2022   Initial location of primary symptomatic stone - - Renal   Initial GSD of primary symptomatic stone - - 6   L Current Plan - Observe Clear   Clear rationale - - Patient preference   Observe rationale - Limited stone burden with good prognosis for spontaneous passage -             PLAN    Video call duration: 15 minutes  20 minutes spent on the date of the encounter doing chart review, history and exam, documentation and further activities per the note    ROXY CARTER MD  [facility]    HPI  Ms. Florencia Arteaga is a 21 year old  female who is being evaluated via a billable video visit by [facility] for follow up of her stone disease.    She has been doing well in the interim. Believes that she passed a stone a few weeks ago while travelling in [place]. She will be going to hospitals for a year in mid-September and would like to reduce her stone burden.    Current CT is  reviewed and is fairly stable with previous from two years ago. Might be one upper pole right papillary  Tip calcification missing. Left lower pole stone has grown from 4 to 6 mm.     Reasonable to clear the left renal stones prior to her year abroad. Will set up for late August. Risks and benefits discussed.    ROS   Review of systems is negative except for HPI.    No past medical history on file.    No past surgical history on file.    Current Outpatient Medications   Medication Sig Dispense Refill     etonogestrel-ethinyl estradiol (NUVARING) 0.12-0.015 MG/24HR vaginal ring Place 1 each vaginally every 28 days       ondansetron (ZOFRAN ODT) 4 MG ODT tab Take 1 tablet (4 mg) by mouth every 6 hours as needed for nausea 10 tablet 1     oxyCODONE (ROXICODONE) 5 MG tablet Take 1-2 tablets (5-10 mg) by mouth every 4 hours as needed for pain 20 tablet 0     tamsulosin (FLOMAX) 0.4 MG capsule Take 1 capsule (0.4 mg) by mouth daily for 14 days 14 capsule 1     tolterodine (DETROL) 2 MG tablet Take 1 tablet (2 mg) by mouth every 12 hours 28 tablet 1       Allergies   Allergen Reactions     Sulfa (Sulfonamide Antibiotics) [Sulfa Drugs] Rash       Social History     Socioeconomic History     Marital status: Single     Spouse name: Not on file     Number of children: Not on file     Years of education: Not on file     Highest education level: Not on file   Occupational History     Not on file   Tobacco Use     Smoking status: Not on file     Smokeless tobacco: Not on file   Substance and Sexual Activity     Alcohol use: Not on file     Drug use: Not on file     Sexual activity: Not on file   Other Topics Concern     Not on file   Social History Narrative     Not on file     Social Determinants of Health     Financial Resource Strain: Not on file   Food Insecurity: Not on file   Transportation Needs: Not on file   Physical Activity: Not on file   Stress: Not on file   Social Connections: Not on file   Intimate Partner  Violence: Not on file   Housing Stability: Not on file       No family history on file.    Objective:     Appears AAO x 3  No vitals obtained due to virtual visit    Labs   Most Recent 3 CBC's:Recent Labs   Lab Test 05/31/20  1905   WBC 12.1*   HGB 12.5   MCV 87        Most Recent 3 BMP's:Recent Labs   Lab Test 07/29/20  1405 05/31/20  1905    138   POTASSIUM 4.0 3.8   CHLORIDE 104 105   CO2 29 23   BUN 12 10   CR 0.79  0.79 0.81   ANIONGAP 8 10   JACQUIE 9.7  9.5 9.4   GLC 80 112     Most Recent Urinalysis:Recent Labs   Lab Test 05/31/20  1923   COLOR Yellow   APPEARANCE Clear   URINEGLC Negative   URINEBILI Negative   URINEKETONE Negative   SG 1.021   UBLD Moderate*   URINEPH 7.0   PROTEIN 50 mg/dL*   UROBILINOGEN <2.0 E.U./dL   NITRITE Negative   LEUKEST Moderate*   RBCU >100*   WBCU 25-50*     Acute Labs   Urine Culture    Culture   Date Value Ref Range Status   05/31/2020 No Growth  Final   05/31/2020 No Growth  Final

## 2022-08-05 ENCOUNTER — HOSPITAL ENCOUNTER (OUTPATIENT)
Facility: HOSPITAL | Age: 22
End: 2022-08-05
Attending: UROLOGY | Admitting: UROLOGY
Payer: COMMERCIAL

## 2022-08-05 ENCOUNTER — TELEPHONE (OUTPATIENT)
Dept: UROLOGY | Facility: CLINIC | Age: 22
End: 2022-08-05

## 2022-08-05 NOTE — TELEPHONE ENCOUNTER
Health Call Center    Phone Message    May a detailed message be left on voicemail: yes     Reason for Call: Other: Florencia is calling wondering if there is a wait list or anything to have her surgery done before 8/31. She states that she is leaving the country and that Dr. Maier told her this needed to be done 2 weeks before her leaving. Please call her back to discuss, thanks!     Action Taken: Message routed to:  Other: [facility]    Travel Screening: Not Applicable

## 2022-08-15 ENCOUNTER — PREP FOR PROCEDURE (OUTPATIENT)
Dept: SURGERY | Facility: CLINIC | Age: 22
End: 2022-08-15

## 2022-08-15 ENCOUNTER — ANESTHESIA EVENT (OUTPATIENT)
Dept: SURGERY | Facility: AMBULATORY SURGERY CENTER | Age: 22
End: 2022-08-15
Payer: COMMERCIAL

## 2022-08-15 DIAGNOSIS — N20.0 CALCULUS OF KIDNEY: Primary | ICD-10-CM

## 2022-08-15 RX ORDER — GABAPENTIN 100 MG/1
300 CAPSULE ORAL
Status: CANCELLED | OUTPATIENT
Start: 2022-08-15

## 2022-08-15 RX ORDER — KETOROLAC TROMETHAMINE 30 MG/ML
15 INJECTION, SOLUTION INTRAMUSCULAR; INTRAVENOUS
Status: CANCELLED | OUTPATIENT
Start: 2022-08-15

## 2022-08-15 RX ORDER — ACETAMINOPHEN 500 MG
1000 TABLET ORAL
Status: CANCELLED | OUTPATIENT
Start: 2022-08-15

## 2022-08-15 NOTE — TELEPHONE ENCOUNTER
Returned patient call Left message to have patient call back to move up her surgery.      Pt is calling again requesting to have her surgery done sooner than her scheduled surgery 8/31/22. Please reach out to pt to discuss. Thanks

## 2022-08-16 ENCOUNTER — HOSPITAL ENCOUNTER (OUTPATIENT)
Facility: AMBULATORY SURGERY CENTER | Age: 22
Discharge: HOME | End: 2022-08-16
Attending: UROLOGY
Payer: COMMERCIAL

## 2022-08-16 ENCOUNTER — TELEPHONE (OUTPATIENT)
Dept: UROLOGY | Facility: CLINIC | Age: 22
End: 2022-08-16

## 2022-08-16 ENCOUNTER — ANESTHESIA (OUTPATIENT)
Dept: SURGERY | Facility: AMBULATORY SURGERY CENTER | Age: 22
End: 2022-08-16
Payer: COMMERCIAL

## 2022-08-16 VITALS
HEIGHT: 67 IN | HEART RATE: 66 BPM | WEIGHT: 140 LBS | TEMPERATURE: 98 F | OXYGEN SATURATION: 100 % | SYSTOLIC BLOOD PRESSURE: 106 MMHG | RESPIRATION RATE: 18 BRPM | BODY MASS INDEX: 21.97 KG/M2 | DIASTOLIC BLOOD PRESSURE: 56 MMHG

## 2022-08-16 DIAGNOSIS — N20.0 CALCULUS OF KIDNEY: Primary | ICD-10-CM

## 2022-08-16 DIAGNOSIS — N20.0 CALCULUS OF KIDNEY: ICD-10-CM

## 2022-08-16 PROCEDURE — 99000 SPECIMEN HANDLING OFFICE-LAB: CPT | Performed by: UROLOGY

## 2022-08-16 PROCEDURE — 52352 CYSTOURETERO W/STONE REMOVE: CPT | Mod: LT | Performed by: UROLOGY

## 2022-08-16 PROCEDURE — 82365 CALCULUS SPECTROSCOPY: CPT | Performed by: UROLOGY

## 2022-08-16 DEVICE — IMPLANTABLE DEVICE: Type: IMPLANTABLE DEVICE | Site: URETER | Status: FUNCTIONAL

## 2022-08-16 RX ORDER — CEFAZOLIN SODIUM 2 G/100ML
2 INJECTION, SOLUTION INTRAVENOUS
Status: COMPLETED | OUTPATIENT
Start: 2022-08-16 | End: 2022-08-16

## 2022-08-16 RX ORDER — PROPOFOL 10 MG/ML
INJECTION, EMULSION INTRAVENOUS PRN
Status: DISCONTINUED | OUTPATIENT
Start: 2022-08-16 | End: 2022-08-16

## 2022-08-16 RX ORDER — TOLTERODINE TARTRATE 2 MG/1
2 TABLET, EXTENDED RELEASE ORAL 2 TIMES DAILY
Qty: 28 TABLET | Refills: 0 | Status: SHIPPED | OUTPATIENT
Start: 2022-08-16 | End: 2022-08-30

## 2022-08-16 RX ORDER — OXYCODONE HYDROCHLORIDE 5 MG/1
5-10 TABLET ORAL EVERY 6 HOURS PRN
Qty: 12 TABLET | Refills: 0 | Status: SHIPPED | OUTPATIENT
Start: 2022-08-16 | End: 2022-08-19

## 2022-08-16 RX ORDER — ONDANSETRON 2 MG/ML
INJECTION INTRAMUSCULAR; INTRAVENOUS PRN
Status: DISCONTINUED | OUTPATIENT
Start: 2022-08-16 | End: 2022-08-16

## 2022-08-16 RX ORDER — CEFAZOLIN SODIUM 2 G/100ML
2 INJECTION, SOLUTION INTRAVENOUS SEE ADMIN INSTRUCTIONS
Status: DISCONTINUED | OUTPATIENT
Start: 2022-08-16 | End: 2022-08-17 | Stop reason: HOSPADM

## 2022-08-16 RX ORDER — FENTANYL CITRATE 50 UG/ML
INJECTION, SOLUTION INTRAMUSCULAR; INTRAVENOUS PRN
Status: DISCONTINUED | OUTPATIENT
Start: 2022-08-16 | End: 2022-08-16

## 2022-08-16 RX ORDER — MEPERIDINE HYDROCHLORIDE 25 MG/ML
12.5 INJECTION INTRAMUSCULAR; INTRAVENOUS; SUBCUTANEOUS
Status: DISCONTINUED | OUTPATIENT
Start: 2022-08-16 | End: 2022-08-17 | Stop reason: HOSPADM

## 2022-08-16 RX ORDER — FENTANYL CITRATE 0.05 MG/ML
25 INJECTION, SOLUTION INTRAMUSCULAR; INTRAVENOUS EVERY 5 MIN PRN
Status: DISCONTINUED | OUTPATIENT
Start: 2022-08-16 | End: 2022-08-17 | Stop reason: HOSPADM

## 2022-08-16 RX ORDER — KETOROLAC TROMETHAMINE 15 MG/ML
15 INJECTION, SOLUTION INTRAMUSCULAR; INTRAVENOUS
Status: DISCONTINUED | OUTPATIENT
Start: 2022-08-16 | End: 2022-08-17 | Stop reason: HOSPADM

## 2022-08-16 RX ORDER — LIDOCAINE HYDROCHLORIDE 10 MG/ML
INJECTION, SOLUTION INFILTRATION; PERINEURAL PRN
Status: DISCONTINUED | OUTPATIENT
Start: 2022-08-16 | End: 2022-08-16

## 2022-08-16 RX ORDER — ACETAMINOPHEN 500 MG
1000 TABLET ORAL
Status: COMPLETED | OUTPATIENT
Start: 2022-08-16 | End: 2022-08-16

## 2022-08-16 RX ORDER — DEXAMETHASONE SODIUM PHOSPHATE 4 MG/ML
INJECTION, SOLUTION INTRA-ARTICULAR; INTRALESIONAL; INTRAMUSCULAR; INTRAVENOUS; SOFT TISSUE PRN
Status: DISCONTINUED | OUTPATIENT
Start: 2022-08-16 | End: 2022-08-16

## 2022-08-16 RX ORDER — LIDOCAINE 40 MG/G
CREAM TOPICAL
Status: DISCONTINUED | OUTPATIENT
Start: 2022-08-16 | End: 2022-08-17 | Stop reason: HOSPADM

## 2022-08-16 RX ORDER — PROPOFOL 10 MG/ML
INJECTION, EMULSION INTRAVENOUS CONTINUOUS PRN
Status: DISCONTINUED | OUTPATIENT
Start: 2022-08-16 | End: 2022-08-16

## 2022-08-16 RX ORDER — SODIUM CHLORIDE, SODIUM LACTATE, POTASSIUM CHLORIDE, CALCIUM CHLORIDE 600; 310; 30; 20 MG/100ML; MG/100ML; MG/100ML; MG/100ML
INJECTION, SOLUTION INTRAVENOUS CONTINUOUS
Status: DISCONTINUED | OUTPATIENT
Start: 2022-08-16 | End: 2022-08-17 | Stop reason: HOSPADM

## 2022-08-16 RX ORDER — FENTANYL CITRATE 0.05 MG/ML
25 INJECTION, SOLUTION INTRAMUSCULAR; INTRAVENOUS
Status: DISCONTINUED | OUTPATIENT
Start: 2022-08-16 | End: 2022-08-17 | Stop reason: HOSPADM

## 2022-08-16 RX ORDER — OXYCODONE HYDROCHLORIDE 5 MG/1
5 TABLET ORAL EVERY 4 HOURS PRN
Status: DISCONTINUED | OUTPATIENT
Start: 2022-08-16 | End: 2022-08-17 | Stop reason: HOSPADM

## 2022-08-16 RX ORDER — GLYCOPYRROLATE 0.2 MG/ML
INJECTION, SOLUTION INTRAMUSCULAR; INTRAVENOUS PRN
Status: DISCONTINUED | OUTPATIENT
Start: 2022-08-16 | End: 2022-08-16

## 2022-08-16 RX ORDER — ONDANSETRON 4 MG/1
4 TABLET, ORALLY DISINTEGRATING ORAL EVERY 30 MIN PRN
Status: DISCONTINUED | OUTPATIENT
Start: 2022-08-16 | End: 2022-08-17 | Stop reason: HOSPADM

## 2022-08-16 RX ORDER — ONDANSETRON 2 MG/ML
4 INJECTION INTRAMUSCULAR; INTRAVENOUS EVERY 30 MIN PRN
Status: DISCONTINUED | OUTPATIENT
Start: 2022-08-16 | End: 2022-08-17 | Stop reason: HOSPADM

## 2022-08-16 RX ORDER — GABAPENTIN 300 MG/1
300 CAPSULE ORAL
Status: COMPLETED | OUTPATIENT
Start: 2022-08-16 | End: 2022-08-16

## 2022-08-16 RX ORDER — TAMSULOSIN HYDROCHLORIDE 0.4 MG/1
0.4 CAPSULE ORAL DAILY
Qty: 14 CAPSULE | Refills: 0 | Status: SHIPPED | OUTPATIENT
Start: 2022-08-16 | End: 2022-08-30

## 2022-08-16 RX ORDER — HYDROMORPHONE HCL IN WATER/PF 6 MG/30 ML
0.2 PATIENT CONTROLLED ANALGESIA SYRINGE INTRAVENOUS EVERY 5 MIN PRN
Status: DISCONTINUED | OUTPATIENT
Start: 2022-08-16 | End: 2022-08-17 | Stop reason: HOSPADM

## 2022-08-16 RX ADMIN — Medication 1000 MG: at 07:38

## 2022-08-16 RX ADMIN — GLYCOPYRROLATE 0.2 MG: 0.2 INJECTION, SOLUTION INTRAMUSCULAR; INTRAVENOUS at 08:39

## 2022-08-16 RX ADMIN — GABAPENTIN 300 MG: 300 CAPSULE ORAL at 07:38

## 2022-08-16 RX ADMIN — KETOROLAC TROMETHAMINE 15 MG: 15 INJECTION, SOLUTION INTRAMUSCULAR; INTRAVENOUS at 07:45

## 2022-08-16 RX ADMIN — DEXAMETHASONE SODIUM PHOSPHATE 10 MG: 4 INJECTION, SOLUTION INTRA-ARTICULAR; INTRALESIONAL; INTRAMUSCULAR; INTRAVENOUS; SOFT TISSUE at 08:39

## 2022-08-16 RX ADMIN — PROPOFOL 160 MCG/KG/MIN: 10 INJECTION, EMULSION INTRAVENOUS at 08:42

## 2022-08-16 RX ADMIN — SODIUM CHLORIDE, SODIUM LACTATE, POTASSIUM CHLORIDE, CALCIUM CHLORIDE: 600; 310; 30; 20 INJECTION, SOLUTION INTRAVENOUS at 09:10

## 2022-08-16 RX ADMIN — ONDANSETRON 4 MG: 2 INJECTION INTRAMUSCULAR; INTRAVENOUS at 08:39

## 2022-08-16 RX ADMIN — CEFAZOLIN SODIUM 2 G: 2 INJECTION, SOLUTION INTRAVENOUS at 08:36

## 2022-08-16 RX ADMIN — PROPOFOL 200 MG: 10 INJECTION, EMULSION INTRAVENOUS at 08:39

## 2022-08-16 RX ADMIN — FENTANYL CITRATE 100 MCG: 50 INJECTION, SOLUTION INTRAMUSCULAR; INTRAVENOUS at 08:39

## 2022-08-16 RX ADMIN — LIDOCAINE HYDROCHLORIDE 5 ML: 10 INJECTION, SOLUTION INFILTRATION; PERINEURAL at 08:39

## 2022-08-16 RX ADMIN — SODIUM CHLORIDE, SODIUM LACTATE, POTASSIUM CHLORIDE, CALCIUM CHLORIDE: 600; 310; 30; 20 INJECTION, SOLUTION INTRAVENOUS at 07:45

## 2022-08-16 NOTE — ANESTHESIA POSTPROCEDURE EVALUATION
Patient: Florencia Arteaga    Procedure: Procedure(s):  CYSTOURETEROSCOPY,  CALCULUS REMOVAL AND URETERAL STENT INSERTION       Anesthesia Type:  General    Note:  Disposition: Outpatient   Postop Pain Control: Uneventful            Sign Out: Well controlled pain   PONV: No   Neuro/Psych: Uneventful            Sign Out: Acceptable/Baseline neuro status   Airway/Respiratory: Uneventful            Sign Out: Acceptable/Baseline resp. status   CV/Hemodynamics: Uneventful            Sign Out: Acceptable CV status; No obvious hypovolemia; No obvious fluid overload   Other NRE: NONE   DID A NON-ROUTINE EVENT OCCUR? No           Last vitals:  Vitals Value Taken Time   BP 96/69 08/16/22 0930   Temp     Pulse 76 08/16/22 0930   Resp 16 08/16/22 0930   SpO2 100 % 08/16/22 0930       Electronically Signed By: Amol Gannon MD  August 16, 2022  9:33 AM

## 2022-08-16 NOTE — OP NOTE
[facility] [place]  [facility] [employer] Operative Note    Florencia Arteaga   August 16, 2022 8/16/2022   Patrick Melissa     Procedure Performed  Procedure(s):  CYSTOURETEROSCOPY,  CALCULUS REMOVAL AND URETERAL STENT INSERTION  1. Cystoscopy  2. Retrograde Pyelography - Left  3. Ureteroscopic Stone Extraction - Left Kidney    4. Ureteral Stent Insertion - Left      Pre-operative Diagnosis  Calculus of kidney [N20.0]    Post-operative Diagnosis  1. Stone Left Kidney Lower Pole      Surgeon(s) and Role:     * Bradley Maier MD - Primary    Anesthesia Type  Not documented     Procedural Summary    Estimation of stone clearance: complete  Subjective stone composition: calcium  Renal papillae assessment: plaques mild  Unanticipated event/findings: none  Post-operative plan: remove own stent in 6 days with extraction string return to clinic in 1 month without CT scan    Narrative    Successful clearance of left lower pole stones.    Procedural Details    Patient is brought to the surgical suite where anesthesia is induced. she is prepped and draped in standard fashion in combined Trendelenberg and lithotomy position.    Cystoscopy: Rigid cystoscopy is performed. Introitus is normal. Bladder is normal..     Retrograde Pyelography:  imaging demonstrates radio-opaque renal stone consistent with pre-operative imaging. Left retrograde pyelography demonstrates radio-opaque renal calculi consistent with pre-operative imaging.     Ureteral Access: Left ureteral access is initiated with Bentson wire. Guide wire access to the kidney is assured. 8F dilator is inserted with minimal resistance. 10F dilator is inserted with minimal resistance. 11F ureteral access sheath obturator is inserted with minimal resistance. 13F 36 cm ureteral access sheath is inserted with minimal resistance.      Flexible Ureteroscopy: Flexible ureteroscope is passed to kidney. Left lower pole stones are identified. Stones are mobile. Small  stones are extracted intact.     Ureteral Stent Insertion: Left 7F 24 cm stent is inserted with good coil in kidney and bladder under fluoroscopic guidance. Stent extraction string left dangling from urethra.      The patient was then taken to the recovery room in good condition.     History reviewed. No pertinent past medical history.     Patient Active Problem List   Diagnosis     Allergic rhinitis     Calculus of kidney        Estimated Blood Loss  .* No values recorded between 8/16/2022  8:49 AM and 8/16/2022  9:09 AM *     ID Type Source Tests Collected by Time Destination   A :  Calculus/Stone Kidney, Left STONE ANALYSIS Bradley Maier MD 8/16/2022  8:59 AM

## 2022-08-16 NOTE — PROGRESS NOTES
I, the writer, have viewed a picture on the patient's phone showing a Negative COVID-19 result.    Fran Fuchs RN on 8/16/2022 at 7:24 AM

## 2022-08-16 NOTE — ANESTHESIA CARE TRANSFER NOTE
Patient: Florencia Arteaga    Procedure: Procedure(s):  CYSTOURETEROSCOPY,  CALCULUS REMOVAL AND URETERAL STENT INSERTION       Diagnosis: Calculus of kidney [N20.0]  Diagnosis Additional Information: No value filed.    Anesthesia Type:   General     Note:    Oropharynx: oropharynx clear of all foreign objects and spontaneously breathing  Level of Consciousness: drowsy  Oxygen Supplementation: face mask  Level of Supplemental Oxygen (L/min / FiO2): 6  Independent Airway: airway patency satisfactory and stable  Dentition: dentition unchanged  Vital Signs Stable: post-procedure vital signs reviewed and stable  Report to RN Given: handoff report given  Patient transferred to: PACU    Handoff Report: Identifed the Patient, Identified the Reponsible Provider, Reviewed the pertinent medical history, Discussed the surgical course, Reviewed Intra-OP anesthesia mangement and issues during anesthesia, Set expectations for post-procedure period and Allowed opportunity for questions and acknowledgement of understanding      Vitals:  Vitals Value Taken Time   BP 92/52    Temp 97.0    Pulse 82    Resp 14    SpO2 100        Electronically Signed By: GALILEO Guy CRNA  August 16, 2022  9:13 AM

## 2022-08-16 NOTE — ANESTHESIA PREPROCEDURE EVALUATION
Anesthesia Pre-Procedure Evaluation    Patient: Florencia Arteaga   MRN: [id number] : 2000        Procedure : Procedure(s):  CYSTOURETEROSCOPY, WITH,LASER LITHOTRIPSY,  CALCULUS REMOVAL AND URETERAL STENT INSERTION          History reviewed. No pertinent past medical history.   Past Surgical History:   Procedure Laterality Date     APPENDECTOMY       REMV/REVISN FULL ARM/LEG CAST        Allergies   Allergen Reactions     Sulfa (Sulfonamide Antibiotics) [Sulfa Drugs] Rash      Social History     Tobacco Use     Smoking status: Never Smoker     Smokeless tobacco: Never Used   Substance Use Topics     Alcohol use: Not Currently      Wt Readings from Last 1 Encounters:   22 63.5 kg (140 lb)        Anesthesia Evaluation   Pt has had prior anesthetic.     No history of anesthetic complications       ROS/MED HX  ENT/Pulmonary:  - neg pulmonary ROS     Neurologic:  - neg neurologic ROS     Cardiovascular:  - neg cardiovascular ROS     METS/Exercise Tolerance:     Hematologic:  - neg hematologic  ROS     Musculoskeletal:  - neg musculoskeletal ROS     GI/Hepatic:  - neg GI/hepatic ROS     Renal/Genitourinary:  - neg Renal ROS   (+) Nephrolithiasis ,     Endo:  - neg endo ROS     Psychiatric/Substance Use:  - neg psychiatric ROS     Infectious Disease:  - neg infectious disease ROS     Malignancy:  - neg malignancy ROS     Other:  - neg other ROS          Physical Exam    Airway        Mallampati: I   TM distance: > 3 FB   Neck ROM: full   Mouth opening: > 3 cm    Respiratory Devices and Support         Dental       (+) lower retainer    B=Bridge, C=Chipped, L=Loose, M=Missing    Cardiovascular   cardiovascular exam normal       Rhythm and rate: regular and normal     Pulmonary   pulmonary exam normal        breath sounds clear to auscultation           OUTSIDE LABS:  CBC:   Lab Results   Component Value Date    WBC 12.1 (H) 2020    HGB 12.5 2020    HCT 36.2 2020     2020     BMP:   Lab  Results   Component Value Date     07/29/2020     05/31/2020    POTASSIUM 4.0 07/29/2020    POTASSIUM 3.8 05/31/2020    CHLORIDE 104 07/29/2020    CHLORIDE 105 05/31/2020    CO2 29 07/29/2020    CO2 23 05/31/2020    BUN 12 07/29/2020    BUN 10 05/31/2020    CR 0.79 07/29/2020    CR 0.79 07/29/2020    GLC 80 07/29/2020     05/31/2020     COAGS: No results found for: PTT, INR, FIBR  POC: No results found for: BGM, HCG, HCGS  HEPATIC:   Lab Results   Component Value Date    ALBUMIN 4.0 07/29/2020    PROTTOTAL 7.5 05/31/2020    ALT 15 05/31/2020    AST 18 05/31/2020    ALKPHOS 46 05/31/2020    BILITOTAL 0.4 05/31/2020     OTHER:   Lab Results   Component Value Date    PH 7.38 07/29/2020    JACQUIE 9.7 07/29/2020    JACQUIE 9.5 07/29/2020    PHOS 4.4 07/29/2020    PHOS 4.2 07/29/2020    MAG 2.0 07/29/2020    LIPASE 13 05/31/2020       Anesthesia Plan    ASA Status:  1   NPO Status:  NPO Appropriate    Anesthesia Type: General.     - Airway: LMA   Induction: Propofol.   Maintenance: TIVA.        Consents    Anesthesia Plan(s) and associated risks, benefits, and realistic alternatives discussed. Questions answered and patient/representative(s) expressed understanding.    - Discussed:     - Discussed with:  Patient         Postoperative Care    Pain management: Multi-modal analgesia.   PONV prophylaxis: Ondansetron (or other 5HT-3), Dexamethasone or Solumedrol     Comments:    Other Comments: Reviewed anesthetic options and risks, including risk of dental trauma. Patient agrees to proceed.     Toradol given per surgeon order in preop.              Amol Gannon MD

## 2022-08-16 NOTE — TELEPHONE ENCOUNTER
Called and spoke with mother.  Went over protocol medications and discussed common stent symptoms.  They will call back as needed.  Izabela Barber RN

## 2022-08-16 NOTE — DISCHARGE INSTRUCTIONS
If you have any questions or concerns regarding your procedure, please contact Dr. Maier, his office number is [phone].    You received 1,000 mg of acetaminophen (Tylenol) at 7:38 AM. Please do not take an additional dose of Tylenol until after 1:38 PM.    Do not exceed 4,000 mg of acetaminophen in a 24 hour period, keep in mind that acetaminophen can also be found in many over-the-counter cold medications as well as narcotics that may be given for pain.    You received a medication called Toradol (a stronger IV ibuprofen) at 7:45 AM. Do NOT take any Ibuprofen / Advil / Motrin / Aleve / Naproxen or products containing Ibuprofen until 1:45 PM or later.    Going Home With a Ureteral Stent      What is it?   A stent is a soft, plastic tube that helps urine (pee) drain into the bladder. During the surgery, it is placed in the ureter the tube that connects the kidney to the bladder. A thin curl at each end of the stent keeps one end in your kidney and the other in your bladder. The stent can not be seen from outside of the body.    Why Do I Have It?   Some sort of blockage is not letting pee drain into your bladder.  This could be from a stone, certain surgeries or kidney infection.    What Should I Expect?   Stents often cause some discomfort. You may have:   -The need to pee suddenly   -Pain when you pee   -A dull backache, which may get worse when you pee  -Blood in your pee (color of fruit punch) and some clots, which may increase with physical activity.     What Can I Do To Feel Better?  -Drink a little more fluids than usual.  You can eat your normal diet.  -Enjoy a warm bath.  -Decrease your activity, some people find bending or twisting movement cause discomfort or increased blood in the urine. Even so, you will not harm yourself.    Stent Removal With String    -Remove the stent in the morning on the specific day as directed by your doctor, usually 6 days.  -Take Tylenol or Ibuprofen and drink fluids 1  "hour prior to removing your stent at home.  -Gently pull (slow but purposeful) on the string in the shower while urinating until the stent is completely removed.   -Call the [facility]'s office if you have questions or concerns at [phone].    What To Expect After Your Stent Is Removed    After stent removal, urine may be bloody and possibly have some bloody clots.  You may experience an \"achy\" pain due to urethral spasms.  This generally only lasts a few hours, but should resolve over the next 2-3 days.    When to call your healthcare provider    Call your healthcare provider if:    Fever of 101.5 F (38.6 C) or higher  Bleeding from the that doesn't stop with moderate pressure  Persistent pain or a sudden increase in pain  Nausea with vomiting that does not ease after a few hours.  Abdominal pain or bloating  Fainting  You still have intolerable pain an hour after taking medicine. The medicine may not be strong enough.   You feel too sleepy, dizzy, or groggy. The medicine may be too strong.   You have side effects such as nausea or vomiting, or skin changes such as rash, itching, or hives. Your healthcare provider may suggest other medicines to control side effects.  Rash, itching, or hives may mean you have an allergic reaction. Report this right away. If you have trouble breathing or facial swelling, call 911 right away.    Coping with pain    *Pain after surgery is normal and expected*    If you have pain after surgery, pain medicine will help you feel better. Take it as told, before pain becomes severe. Also, ask your healthcare provider or pharmacist about other ways to control pain. This might be with heat, ice, or relaxation. And follow any other instructions your surgeon or nurse gives you.    Tips for taking pain medicine    To get the best relief possible, remember these points:    Pain medicines can upset your stomach. Taking them with a little food may help.  Most pain relievers taken by " mouth need at least 20 to 30 minutes to start to work.  Don't wait till your pain becomes severe before you take your medicine. Try to time your medicine so that you can take it before starting an activity. This might be before you get dressed, go for a walk, or sit down for dinner.  Constipation is a common side effect of pain medicines. You can take medicines such as laxatives (Miralax) or stool softeners to help ease constipation. Drinking lots of fluids and eating foods such as fruits and vegetables that are high in fiber can also help.  Drinking alcohol and taking pain medicine can cause dizziness and slow your breathing. It can even be deadly. Don't drink alcohol while taking pain medicine.  Pain medicine can make you react more slowly to things. Don't drive or run machinery while taking pain medicine.  Your healthcare provider may tell you to take acetaminophen to help ease your pain. Ask him or her how much you are supposed to take each day. Acetaminophen or other pain relievers may interact with your prescription medicines or other over-the-counter (OTC) medicines. Some prescription medicines have acetaminophen and other ingredients. Using both prescription and OTC acetaminophen for pain can cause you to overdose. Read the labels on your OTC medicines with care. This will help you to clearly know the list of ingredients, how much to take, and any warnings. It may also help you not take too much acetaminophen. If you have questions or don't understand the information, ask your pharmacist or healthcare provider to explain it to you before you take the OTC medicine.    Discharge Instructions: After Your Surgery, regarding Anesthesia    You ve just had surgery. During surgery, you were given medicine called anesthesia to keep you relaxed and free of pain. After surgery, you may have some pain or nausea. This is common. Here are some tips for feeling better and getting well after surgery.    Going home    Your  healthcare provider will show you how to take care of yourself when you go home. He or she will also answer your questions. Have an adult family member or friend drive you home. For the first 24 hours after your surgery:    Don't drive or use heavy equipment.  Don't make important decisions or sign legal papers.  Don't drink alcohol.  Have someone stay with you for the next 24 hours. He or she can watch for problems and help keep you safe.  Be sure to go to all follow-up visits with your healthcare provider. And rest after your surgery for as long as your healthcare provider tells you to.    Managing nausea    Some people have an upset stomach after surgery. This is often because of anesthesia, pain, or pain medicine, or the stress of surgery. These tips will help you handle nausea and eat healthy foods as you get better. If you were on a special food plan before surgery, ask your healthcare provider if you should follow it while you get better. These tips may help:    Don't push yourself to eat. Your body will tell you when to eat and how much.  Start off with clear liquids and soup. They are easier to digest.  Next try semi-solid foods, such as mashed potatoes, applesauce, and gelatin, as you feel ready.  Slowly move to solid foods. Don t eat fatty, rich, or spicy foods at first.  Don't force yourself to have 3 large meals a day. Instead eat smaller amounts more often.  Take pain medicines with a small amount of solid food, such as crackers or toast, to prevent nausea.    If you have obstructive sleep apnea    You were given anesthesia medicine during surgery to keep you comfortable and free of pain. After surgery, you may have more apnea spells because of this medicine and other medicines you were given. The spells may last longer than usual.   At home:    Keep using the continuous positive airway pressure (CPAP) device when you sleep. Unless your healthcare provider tells you not to, use it when you sleep, day  or night. CPAP is a common device used to treat obstructive sleep apnea.  Talk with your provider before taking any pain medicine, muscle relaxants, or sedatives. Your provider will tell you about the possible dangers of taking these medicines.

## 2022-08-16 NOTE — TELEPHONE ENCOUNTER
[facility] Call Center    Phone Message    May a detailed message be left on voicemail: no     Reason for Call: Medication Question or concern regarding medication   Prescription Clarification  Name of Medication: tamsulosin (FLOMAX) 0.4 MG capsule  Prescribing Provider: Livier   Pharmacy: [facility] [id number] [place], [place] - [address]   What on the order needs clarification? Patient's mother - Dior - called stating that the [facility] had contacted them regarding the medication that was just prescribed to her - tamsulosin (FLOMAX) 0.4 MG capsule - and that they informed her that this medication has sulphate in it. Patient is allergic to sulphate and they are looking to see what the patient should do in regards to this medication. Patient has not yet taken any of the tamsulosin. Please reach out to Dior - [phone] - to further discuss. Thank you      Action Taken: Other: [facility]    Travel Screening: Not Applicable

## 2022-08-16 NOTE — PROGRESS NOTES
Offered patient pregnancy test.  Explained there are risks associated with anesthesia and pregnancy.  Patient verbalizes understanding, denies possibility of pregnancy and declines pregnancy test.    Fran Fuchs RN on 8/16/2022 at 7:27 AM

## 2022-08-16 NOTE — ANESTHESIA PROCEDURE NOTES
Airway       Patient location during procedure: OR  Staff -        Anesthesiologist:  Amol Gannon MD       CRNA: Claudette Merlos APRN CRNA       Performed By: CRNAIndications and Patient Condition       Indications for airway management: jv-procedural       Induction type:intravenous       Mask difficulty assessment: 1 - vent by mask    Final Airway Details       Final airway type: supraglottic airway    Supraglottic Airway Details        Type: LMA       LMA size: 4    Post intubation assessment        Placement verified by: capnometry, equal breath sounds and chest rise        Number of attempts at approach: 1       Secured with: silk tape       Ease of procedure: easy       Dentition: Intact and Unchanged

## 2022-08-18 LAB
APPEARANCE STONE: NORMAL
COMPN STONE: NORMAL
SPECIMEN WT: NORMAL MG

## 2022-08-22 ENCOUNTER — TELEPHONE (OUTPATIENT)
Dept: UROLOGY | Facility: CLINIC | Age: 22
End: 2022-08-22

## 2022-08-22 NOTE — TELEPHONE ENCOUNTER
Message left for patient to call back to set up her one month post op w/o imaging and to make sure she was able to remove her stent.  Izabela Barber RN

## 2022-10-01 ENCOUNTER — HEALTH MAINTENANCE LETTER (OUTPATIENT)
Age: 22
End: 2022-10-01

## 2023-10-21 ENCOUNTER — HEALTH MAINTENANCE LETTER (OUTPATIENT)
Age: 23
End: 2023-10-21

## 2024-12-08 ENCOUNTER — HEALTH MAINTENANCE LETTER (OUTPATIENT)
Age: 24
End: 2024-12-08